# Patient Record
Sex: FEMALE | Race: OTHER
[De-identification: names, ages, dates, MRNs, and addresses within clinical notes are randomized per-mention and may not be internally consistent; named-entity substitution may affect disease eponyms.]

---

## 2019-01-18 ENCOUNTER — HOSPITAL ENCOUNTER (EMERGENCY)
Dept: HOSPITAL 62 - ER | Age: 19
Discharge: HOME | End: 2019-01-18
Payer: OTHER GOVERNMENT

## 2019-01-18 VITALS — SYSTOLIC BLOOD PRESSURE: 103 MMHG | DIASTOLIC BLOOD PRESSURE: 67 MMHG

## 2019-01-18 DIAGNOSIS — B96.89: ICD-10-CM

## 2019-01-18 LAB
APPEARANCE UR: (no result)
APTT PPP: YELLOW S
BACTERIA (WET MOUNT): (no result)
BILIRUB UR QL STRIP: NEGATIVE
CHLAM PCR: NOT DETECTED
EPITHELIALS (WET MOUNT): (no result)
GLUCOSE UR STRIP-MCNC: NEGATIVE MG/DL
GON PCR: NOT DETECTED
KETONES UR STRIP-MCNC: NEGATIVE MG/DL
NITRITE UR QL STRIP: POSITIVE
PH UR STRIP: 6 [PH] (ref 5–9)
PROT UR STRIP-MCNC: NEGATIVE MG/DL
RBCS (WET MOUNT): (no result)
SP GR UR STRIP: 1.01
T.VAGINALIS (WET MOUNT): (no result)
UROBILINOGEN UR-MCNC: NEGATIVE MG/DL (ref ?–2)
WBCS (WET MOUNT): (no result)
YEAST (WET MOUNT): (no result)

## 2019-01-18 PROCEDURE — 87210 SMEAR WET MOUNT SALINE/INK: CPT

## 2019-01-18 PROCEDURE — 87491 CHLMYD TRACH DNA AMP PROBE: CPT

## 2019-01-18 PROCEDURE — 87088 URINE BACTERIA CULTURE: CPT

## 2019-01-18 PROCEDURE — 87186 SC STD MICRODIL/AGAR DIL: CPT

## 2019-01-18 PROCEDURE — 99283 EMERGENCY DEPT VISIT LOW MDM: CPT

## 2019-01-18 PROCEDURE — 87591 N.GONORRHOEAE DNA AMP PROB: CPT

## 2019-01-18 PROCEDURE — 81025 URINE PREGNANCY TEST: CPT

## 2019-01-18 PROCEDURE — 87086 URINE CULTURE/COLONY COUNT: CPT

## 2019-01-18 PROCEDURE — 81001 URINALYSIS AUTO W/SCOPE: CPT

## 2019-01-18 NOTE — ER DOCUMENT REPORT
ED Medical Screen (RME)





- General


Chief Complaint: Post Partum Problem


Stated Complaint: VAGINAL PAIN


Time Seen by Provider: 01/18/19 13:21


Mode of Arrival: Ambulatory


Information source: Patient


Notes: 





Patient is 3 weeks postpartum presents with pelvic discomfort, foul odor.  She 

denies fever.  She denies nausea, vomiting or abdominal pain.


TRAVEL OUTSIDE OF THE [place] IN LAST 30 DAYS: No





- Related Data


Allergies/Adverse Reactions: 


                                        





No Known Allergies Allergy (Verified 01/18/19 12:40)


   











Past Medical History


Renal/ Medical History: Denies: Hx Peritoneal Dialysis





Physical Exam





- Vital signs


Vitals: 





                                        











Temp Pulse Resp BP Pulse Ox


 


 98.5 F   70   16   119/79   100 


 


 01/18/19 12:48  01/18/19 12:48  01/18/19 12:48  01/18/19 12:48  01/18/19 12:48














Course





- Vital Signs


Vital signs: 





                                        











Temp Pulse Resp BP Pulse Ox


 


 98.5 F   70   16   119/79   100 


 


 01/18/19 12:48  01/18/19 12:48  01/18/19 12:48  01/18/19 12:48  01/18/19 12:48














- Laboratory


Laboratory results interpreted by me: 





                                        











  01/18/19





  12:40


 


Urine Blood  LARGE H


 


Urine Nitrite  POSITIVE H


 


Ur Leukocyte Esterase  LARGE H














Doctor's Discharge





- Discharge


Referrals: 


ANGÉLICA,NO [Primary Care Provider] - Follow up as needed

## 2019-01-18 NOTE — ER DOCUMENT REPORT
ED General





- General


Chief Complaint: Post Partum Problem


Stated Complaint: VAGINAL PAIN


Time Seen by Provider: 01/18/19 13:21


Mode of Arrival: Ambulatory


TRAVEL OUTSIDE OF THE [place] IN LAST 30 DAYS: No





- HPI


Notes: 





Patient is a 18-year-old female approximately 3 weeks postpartum who presents 

emergency department complaining of urinary burning, urgency, frequency, and 

odor over the last week.  Patient states that she has noticed some scant vaginal

discharge and faint odor.  Patient states that she is currently breast-feeding 

and has not been sexually active.  She is eating and drinking without any 

difficulties.  She is having normal bowel movements.  Patient states that she 

does have some pain with hemorrhoids when she has a bowel movement and would 

like a cream for that.  No other concerns or complaints.  Denies drug allergies.

 Denies any headache, fever, neck pain, URI, sore throat, chest pain, 

palpitations, syncope, cough, shortness of breath, wheeze, dyspnea, abdominal 

pain, nausea/vomiting/diarrhea, back pain, or rash.





- Related Data


Allergies/Adverse Reactions: 


                                        





No Known Allergies Allergy (Verified 01/18/19 12:40)


   











Past Medical History





- General


Information source: Patient





- Social History


Smoking Status: Current Every Day Smoker


Frequency of alcohol use: None


Drug Abuse: None


Family History: Reviewed & Not Pertinent


Patient has suicidal ideation: No


Patient has homicidal ideation: No


Renal/ Medical History: Denies: Hx Peritoneal Dialysis


Past Surgical History: Reports: Hx Oral Surgery - wisdom teeth





Review of Systems





- Review of Systems


-: Yes All other systems reviewed and negative





Physical Exam





- Vital signs


Vitals: 


                                        











Temp Pulse Resp BP Pulse Ox


 


 98.5 F   70   16   119/79   100 


 


 01/18/19 12:48  01/18/19 12:48  01/18/19 12:48  01/18/19 12:48  01/18/19 12:48














- Notes


Notes: 





PHYSICAL EXAMINATION:





GENERAL: Well-appearing, well-nourished and in no acute distress.





LUNGS: Breath sounds clear to auscultation bilaterally and equal.  No wheezes 

rales or rhonchi.





HEART: Regular rate and rhythm without murmurs, rubs, gallops.





ABDOMEN: Soft, nontender, nondistended abdomen.  No guarding, no rebound.  No 

masses appreciated.  Normal bowel sounds present.  No CVA tenderness 

bilaterally.  





Rectal (accompanied by female pct, renato):  no thrombosed hemorrhoid noted.  +

very small external hemorrhoid.  No fissure.





: Pt declined and wanted to self-swab.





Musculoskeletal: FROM to passive/active. Strength 5+/5. 





Extremities:  No cyanosis, clubbing, or edema b/l.  Peripheral pulses 2+.  

Capillary refill less than 3 seconds.





NEUROLOGICAL: Normal speech, normal gait.  Normal sensory, motor exams 





PSYCH: Normal mood, normal affect.





SKIN: Warm, Dry, normal turgor, no rashes or lesions noted.





Course





- Re-evaluation


Re-evalutation: 





01/18/19 15:35


Patient is an afebrile, well-hydrated, 18-year-old female who presents emergency

department with an acute UTI and bacterial vaginosis.  Vitals are acceptable 

without any significant tachycardia, tachypnea, or hypoxia.  PE is otherwise 

unremarkable.  Patient's abdomen is soft and nontender.  She is nontoxic-

appearing and is tolerating p.o. without difficulty.  See urinalysis results.  

See wet mount results.  Gonorrhea tests are pending, but patient has declined 

any treatment prior to results.  No further labs or imaging warranted at this 

time.  Low suspicion/risk for acute appendicitis, bowel obstruction, acute 

cholecystitis, acute cholangitis, perforated diverticulitis, incarcerated 

hernia, pancreatitis, perforated ulcer, peritonitis, sepsis, pelvic inflammatory

disease, ectopic pregnancy, tubo-ovarian abscess, ovarian torsion, or other 

systemic emergent condition at this time.  Patient is aware that her condition 

can change from initial presentation and she needs to monitor symptoms closely 

and seek medical attention if any acute changes.  I will send her home with 

metronidazole vaginal suppository as well as Keflex.  Conservative measures 

otherwise for symptoms.  Recheck with your PCM/OB/GYN in 3-5 days.  Return to 

the ED with any worsening/concerning symptoms otherwise as reviewed in 

discharge.  Patient is in agreement.





- Vital Signs


Vital signs: 


                                        











Temp Pulse Resp BP Pulse Ox


 


 98.5 F   70   16   119/79   100 


 


 01/18/19 12:48  01/18/19 12:48  01/18/19 12:48  01/18/19 12:48  01/18/19 12:48














- Laboratory


Laboratory results interpreted by me: 


                                        











  01/18/19





  12:40


 


Urine Blood  LARGE H


 


Urine Nitrite  POSITIVE H


 


Ur Leukocyte Esterase  LARGE H














Discharge





- Discharge


Clinical Impression: 


 Acute UTI (urinary tract infection), Bacterial vaginosis





Condition: Stable


Disposition: HOME, SELF-CARE


Instructions:  Urinary Tract Infection ([facility]), Vaginosis, Bacterial ([facility]), 

Cephalexin ([facility])


Additional Instructions: 


Push fluids (i.e. water, cranberry juice)


Proper hygenic technique


Keep the skin clean


Tylenol/ibuprofen as needed


Take medications as directed


F/u with your PCM/OB/GYN in 3-5 days for a recheck


Consider consult with a Urologist for ongoing/worsening symptoms.


Return to the ED with any worsening symptoms and/or development of fever, 

headache, chest pain, palpitations, syncope, shortness of breath, trouble 

breathing, abdominal pain, n/v/d, blood in stool/urine, loss of control of 

bowel/bladder, urinary retention, or other worsening symptoms that are 

concerning to you.


Prescriptions: 


Cephalexin Monohydrate [Keflex 500 mg Capsule] 500 mg PO TID #21 capsule


Metronidazole [Nuvessa] 5 gm VG DAILY 7 Days #7 [email or web address]


Referrals: 


[facility] [Provider Group] - Follow up as needed